# Patient Record
Sex: MALE | URBAN - NONMETROPOLITAN AREA
[De-identification: names, ages, dates, MRNs, and addresses within clinical notes are randomized per-mention and may not be internally consistent; named-entity substitution may affect disease eponyms.]

---

## 2020-12-07 ENCOUNTER — OFFICE VISIT (OUTPATIENT)
Dept: URBAN - NONMETROPOLITAN AREA CLINIC 2 | Facility: CLINIC | Age: 70
End: 2020-12-07

## 2020-12-07 NOTE — HPI-TODAY'S VISIT:
70 year old with Down syndrome, GERD, aspiration, startd on famotidine as was previously on ranitidine.   - Plan: - Esopahgram - EGD - Discuss colonoscopy or colon cancer screening.